# Patient Record
Sex: MALE | Race: WHITE | NOT HISPANIC OR LATINO | URBAN - METROPOLITAN AREA
[De-identification: names, ages, dates, MRNs, and addresses within clinical notes are randomized per-mention and may not be internally consistent; named-entity substitution may affect disease eponyms.]

---

## 2018-11-29 PROBLEM — Z00.00 ENCOUNTER FOR PREVENTIVE HEALTH EXAMINATION: Status: ACTIVE | Noted: 2018-11-29

## 2018-11-30 ENCOUNTER — OUTPATIENT (OUTPATIENT)
Dept: OUTPATIENT SERVICES | Facility: HOSPITAL | Age: 58
LOS: 1 days | End: 2018-11-30
Payer: COMMERCIAL

## 2018-11-30 VITALS
TEMPERATURE: 98 F | HEART RATE: 76 BPM | WEIGHT: 177.91 LBS | SYSTOLIC BLOOD PRESSURE: 150 MMHG | HEIGHT: 69 IN | RESPIRATION RATE: 16 BRPM | OXYGEN SATURATION: 100 % | DIASTOLIC BLOOD PRESSURE: 90 MMHG

## 2018-11-30 DIAGNOSIS — Z90.89 ACQUIRED ABSENCE OF OTHER ORGANS: Chronic | ICD-10-CM

## 2018-11-30 DIAGNOSIS — C44.91 BASAL CELL CARCINOMA OF SKIN, UNSPECIFIED: ICD-10-CM

## 2018-11-30 DIAGNOSIS — C44.1122 BASAL CELL CARCINOMA OF SKIN OF RIGHT LOWER EYELID, INCLUDING CANTHUS: ICD-10-CM

## 2018-11-30 DIAGNOSIS — C44.91 BASAL CELL CARCINOMA OF SKIN, UNSPECIFIED: Chronic | ICD-10-CM

## 2018-11-30 DIAGNOSIS — Z01.818 ENCOUNTER FOR OTHER PREPROCEDURAL EXAMINATION: ICD-10-CM

## 2018-11-30 PROCEDURE — G0463: CPT

## 2018-11-30 RX ORDER — LIDOCAINE HCL 20 MG/ML
0.2 VIAL (ML) INJECTION ONCE
Qty: 0 | Refills: 0 | Status: DISCONTINUED | OUTPATIENT
Start: 2018-12-11 | End: 2018-12-26

## 2018-11-30 RX ORDER — SODIUM CHLORIDE 9 MG/ML
3 INJECTION INTRAMUSCULAR; INTRAVENOUS; SUBCUTANEOUS EVERY 8 HOURS
Qty: 0 | Refills: 0 | Status: DISCONTINUED | OUTPATIENT
Start: 2018-12-11 | End: 2018-12-26

## 2018-11-30 NOTE — H&P PST ADULT - PMH
Basal cell carcinoma (BCC)  multiple excisions, right lower eyelid  Hyperlipidemia Basal cell carcinoma (BCC)  right lower eyelid  Hyperlipidemia

## 2018-11-30 NOTE — H&P PST ADULT - ATTENDING COMMENTS
For right lower eyelid reconstruction with needed grafts and flaps.  Risks, benefits, options reviewed.  All questions answered.

## 2018-11-30 NOTE — H&P PST ADULT - PSH
History of tonsillectomy Basal cell carcinoma (BCC)  h/o multiple excisions  History of tonsillectomy

## 2018-11-30 NOTE — H&P PST ADULT - NSANTHOSAYNRD_GEN_A_CORE
No. LA screening performed.  STOP BANG Legend: 0-2 = LOW Risk; 3-4 = INTERMEDIATE Risk; 5-8 = HIGH Risk

## 2018-11-30 NOTE — H&P PST ADULT - PROBLEM SELECTOR PLAN 1
Right lower eyelid   PST instructions provided, patient verbalized understanding.   Blood work will be done by PCP office 12/3/18, patient has annual visit .

## 2018-12-10 ENCOUNTER — TRANSCRIPTION ENCOUNTER (OUTPATIENT)
Age: 58
End: 2018-12-10

## 2018-12-11 ENCOUNTER — OUTPATIENT (OUTPATIENT)
Dept: OUTPATIENT SERVICES | Facility: HOSPITAL | Age: 58
LOS: 1 days | End: 2018-12-11
Payer: COMMERCIAL

## 2018-12-11 VITALS
RESPIRATION RATE: 16 BRPM | SYSTOLIC BLOOD PRESSURE: 155 MMHG | DIASTOLIC BLOOD PRESSURE: 80 MMHG | TEMPERATURE: 99 F | OXYGEN SATURATION: 98 % | HEART RATE: 90 BPM

## 2018-12-11 VITALS
RESPIRATION RATE: 18 BRPM | HEART RATE: 95 BPM | DIASTOLIC BLOOD PRESSURE: 81 MMHG | OXYGEN SATURATION: 98 % | WEIGHT: 177.91 LBS | TEMPERATURE: 98 F | SYSTOLIC BLOOD PRESSURE: 174 MMHG | HEIGHT: 69 IN

## 2018-12-11 DIAGNOSIS — C44.91 BASAL CELL CARCINOMA OF SKIN, UNSPECIFIED: Chronic | ICD-10-CM

## 2018-12-11 DIAGNOSIS — Z90.89 ACQUIRED ABSENCE OF OTHER ORGANS: Chronic | ICD-10-CM

## 2018-12-11 DIAGNOSIS — C44.1122 BASAL CELL CARCINOMA OF SKIN OF RIGHT LOWER EYELID, INCLUDING CANTHUS: ICD-10-CM

## 2018-12-11 PROCEDURE — 21282 LATERAL CANTHOPEXY: CPT

## 2018-12-11 PROCEDURE — 15733 MUSC MYOQ/FSCQ FLP H&N PEDCL: CPT

## 2018-12-11 PROCEDURE — 67973 RECONSTRUCTION OF EYELID: CPT | Mod: E4

## 2018-12-11 RX ORDER — SODIUM CHLORIDE 9 MG/ML
1000 INJECTION, SOLUTION INTRAVENOUS
Qty: 0 | Refills: 0 | Status: DISCONTINUED | OUTPATIENT
Start: 2018-12-11 | End: 2018-12-26

## 2018-12-11 RX ORDER — ACETAMINOPHEN 500 MG
1000 TABLET ORAL ONCE
Qty: 0 | Refills: 0 | Status: DISCONTINUED | OUTPATIENT
Start: 2018-12-11 | End: 2018-12-26

## 2018-12-11 RX ORDER — HYDROMORPHONE HYDROCHLORIDE 2 MG/ML
0.25 INJECTION INTRAMUSCULAR; INTRAVENOUS; SUBCUTANEOUS
Qty: 0 | Refills: 0 | Status: DISCONTINUED | OUTPATIENT
Start: 2018-12-11 | End: 2018-12-11

## 2018-12-11 RX ORDER — ROSUVASTATIN CALCIUM 5 MG/1
1 TABLET ORAL
Qty: 0 | Refills: 0 | COMMUNITY

## 2018-12-11 RX ORDER — OXYCODONE HYDROCHLORIDE 5 MG/1
5 TABLET ORAL ONCE
Qty: 0 | Refills: 0 | Status: DISCONTINUED | OUTPATIENT
Start: 2018-12-11 | End: 2018-12-11

## 2018-12-11 RX ORDER — ONDANSETRON 8 MG/1
4 TABLET, FILM COATED ORAL ONCE
Qty: 0 | Refills: 0 | Status: DISCONTINUED | OUTPATIENT
Start: 2018-12-11 | End: 2018-12-26

## 2018-12-11 NOTE — ASU DISCHARGE PLAN (ADULT/PEDIATRIC). - NOTIFY
Swelling that continues/Fever greater than 101/Pain not relieved by Medications/Persistent Nausea and Vomiting/Unable to Urinate/Bleeding that does not stop/Inability to Tolerate Liquids or Foods

## 2020-01-29 NOTE — ASU DISCHARGE PLAN (ADULT/PEDIATRIC). - I HAVE READ AND UNDERSTAND THE ABOVE INSTRUCTIONS AND I UNDERSTAND IT IS IMPORTANT TO FOLLOW THESE INSTRUCTIONS
Statement Selected Mid-Level Procedure Text (A): After obtaining clear surgical margins the patient was sent to a mid-level provider for surgical repair.  The patient understands they will receive post-surgical care and follow-up from the mid-level provider.

## 2022-11-30 NOTE — ASU PREOP CHECKLIST - SIDE RAILS UP
Pre-Surgery Instructions:   Medication Instructions   • Alpha-D-Galactosidase (BEANO PO) Stop taking 7 days prior to surgery  • amLODIPine (NORVASC) 5 mg tablet Take day of surgery  • atorvastatin (LIPITOR) 40 mg tablet Take night before surgery   • AYR SALINE NASAL NA Take night before surgery   • [place]-Glucosamine-Vit D (OSTEO BI-FLEX ONE PER DAY PO) Stop taking 7 days prior to surgery  • busPIRone (BUSPAR) 15 mg tablet Take day of surgery  • calcium citrate-Vitamin D 200 mg-250 units Stop taking 7 days prior to surgery  • divalproex sodium (DEPAKOTE ER) 500 mg 24 hr tablet Take day of surgery  • Eliquis 5 MG Instructions provided by MD   • escitalopram (LEXAPRO) 20 mg tablet Take day of surgery  • famotidine (PEPCID) 20 mg tablet Take day of surgery  • fexofenadine (ALLEGRA) 180 MG tablet Take day of surgery  • flecainide (TAMBOCOR) 100 mg tablet Take day of surgery  • lisinopril (ZESTRIL) 20 mg tablet Hold day of surgery  • MELATONIN PO Stop taking 7 days prior to surgery  • multivitamin (THERAGRAN) TABS Instructions provided by MD   • pantoprazole (PROTONIX) 40 mg tablet Take day of surgery  • pindolol (VISKEN) 5 mg tablet Take day of surgery  • Probiotic Product (PROBIOTIC PO) Stop taking 7 days prior to surgery     • valACYclovir (VALTREX) 1,000 mg tablet Uses PRN- OK to take day of surgery     Pt verbalizes understanding of the following:    - Bathing instructions, has chg, neck down, no genitals  - No lotions, powders, sprays, deodorant, jewelry, or make-up      - NPO after MN  - Avoid OTC non-directed Vit/ Suppl/ Herbals 7 days prior to surgery to ensure no drug interactions with perioperative surgical/ anesthetic meds  - Avoid NSAIDs 3 days prior  - Avoid ASA containing products 5 days prior  - Continue statin medication up through and including the day of surgery    - Bring list of meds with last dose noted  - Vital Access cards & photo id     - Notify surgeon if you develop any cold symptoms, change in your health history or develop open wounds     - Did the surgeon's office give you any other special instructions? Yes, [facility]VI  - Did you require any clearances?  Cardio n/a

## 2023-05-06 NOTE — ASU PATIENT PROFILE, ADULT - AS SC BRADEN NUTRITION
details (4) excellent Extra occular movements intact/PERRLA/Anicteric conjunctivae/No drainage/External ear normal/Nasal mucosa normal/Normal dentition/No oral lesions/Normal oropharynx

## 2023-06-20 NOTE — H&P PST ADULT - NS NEC GEN PE MLT EXAM PC
To ER if acutely worse Follow up with your PCP in 3 - 4 days if worsening or not improving:    Please follow-up with Pediatrics:    [facility]- Address: [address] [place] [address] [address], [place], [address]  Phone: [phone]   
No bruits; no thyromegaly or nodules